# Patient Record
Sex: MALE | Race: OTHER | NOT HISPANIC OR LATINO | URBAN - METROPOLITAN AREA
[De-identification: names, ages, dates, MRNs, and addresses within clinical notes are randomized per-mention and may not be internally consistent; named-entity substitution may affect disease eponyms.]

---

## 2019-08-24 ENCOUNTER — EMERGENCY (EMERGENCY)
Facility: HOSPITAL | Age: 24
LOS: 1 days | Discharge: HOME | End: 2019-08-24
Attending: EMERGENCY MEDICINE | Admitting: EMERGENCY MEDICINE
Payer: COMMERCIAL

## 2019-08-24 VITALS
OXYGEN SATURATION: 100 % | RESPIRATION RATE: 16 BRPM | DIASTOLIC BLOOD PRESSURE: 65 MMHG | SYSTOLIC BLOOD PRESSURE: 110 MMHG | HEART RATE: 98 BPM

## 2019-08-24 VITALS
DIASTOLIC BLOOD PRESSURE: 71 MMHG | TEMPERATURE: 98 F | OXYGEN SATURATION: 99 % | HEART RATE: 95 BPM | RESPIRATION RATE: 16 BRPM | SYSTOLIC BLOOD PRESSURE: 119 MMHG

## 2019-08-24 LAB
ALBUMIN SERPL ELPH-MCNC: 4.7 G/DL — SIGNIFICANT CHANGE UP (ref 3.3–5)
ALP SERPL-CCNC: 61 U/L — SIGNIFICANT CHANGE UP (ref 40–120)
ALT FLD-CCNC: 66 U/L — HIGH (ref 4–41)
ANION GAP SERPL CALC-SCNC: 15 MMO/L — HIGH (ref 7–14)
APPEARANCE UR: CLEAR — SIGNIFICANT CHANGE UP
AST SERPL-CCNC: 39 U/L — SIGNIFICANT CHANGE UP (ref 4–40)
BACTERIA # UR AUTO: NEGATIVE — SIGNIFICANT CHANGE UP
BASOPHILS # BLD AUTO: 0.04 K/UL — SIGNIFICANT CHANGE UP (ref 0–0.2)
BASOPHILS NFR BLD AUTO: 0.3 % — SIGNIFICANT CHANGE UP (ref 0–2)
BILIRUB SERPL-MCNC: 0.4 MG/DL — SIGNIFICANT CHANGE UP (ref 0.2–1.2)
BILIRUB UR-MCNC: NEGATIVE — SIGNIFICANT CHANGE UP
BLOOD UR QL VISUAL: NEGATIVE — SIGNIFICANT CHANGE UP
BUN SERPL-MCNC: 15 MG/DL — SIGNIFICANT CHANGE UP (ref 7–23)
CALCIUM SERPL-MCNC: 9.5 MG/DL — SIGNIFICANT CHANGE UP (ref 8.4–10.5)
CHLORIDE SERPL-SCNC: 103 MMOL/L — SIGNIFICANT CHANGE UP (ref 98–107)
CO2 SERPL-SCNC: 20 MMOL/L — LOW (ref 22–31)
COLOR SPEC: SIGNIFICANT CHANGE UP
CREAT SERPL-MCNC: 1.21 MG/DL — SIGNIFICANT CHANGE UP (ref 0.5–1.3)
EOSINOPHIL # BLD AUTO: 0.03 K/UL — SIGNIFICANT CHANGE UP (ref 0–0.5)
EOSINOPHIL NFR BLD AUTO: 0.2 % — SIGNIFICANT CHANGE UP (ref 0–6)
GLUCOSE SERPL-MCNC: 114 MG/DL — HIGH (ref 70–99)
GLUCOSE UR-MCNC: NEGATIVE — SIGNIFICANT CHANGE UP
HCT VFR BLD CALC: 43.3 % — SIGNIFICANT CHANGE UP (ref 39–50)
HGB BLD-MCNC: 14.9 G/DL — SIGNIFICANT CHANGE UP (ref 13–17)
HYALINE CASTS # UR AUTO: NEGATIVE — SIGNIFICANT CHANGE UP
IMM GRANULOCYTES NFR BLD AUTO: 0.4 % — SIGNIFICANT CHANGE UP (ref 0–1.5)
KETONES UR-MCNC: SIGNIFICANT CHANGE UP
LEUKOCYTE ESTERASE UR-ACNC: NEGATIVE — SIGNIFICANT CHANGE UP
LYMPHOCYTES # BLD AUTO: 1.8 K/UL — SIGNIFICANT CHANGE UP (ref 1–3.3)
LYMPHOCYTES # BLD AUTO: 11.3 % — LOW (ref 13–44)
MAGNESIUM SERPL-MCNC: 2.2 MG/DL — SIGNIFICANT CHANGE UP (ref 1.6–2.6)
MCHC RBC-ENTMCNC: 30.3 PG — SIGNIFICANT CHANGE UP (ref 27–34)
MCHC RBC-ENTMCNC: 34.4 % — SIGNIFICANT CHANGE UP (ref 32–36)
MCV RBC AUTO: 88 FL — SIGNIFICANT CHANGE UP (ref 80–100)
MONOCYTES # BLD AUTO: 0.63 K/UL — SIGNIFICANT CHANGE UP (ref 0–0.9)
MONOCYTES NFR BLD AUTO: 4 % — SIGNIFICANT CHANGE UP (ref 2–14)
NEUTROPHILS # BLD AUTO: 13.34 K/UL — HIGH (ref 1.8–7.4)
NEUTROPHILS NFR BLD AUTO: 83.8 % — HIGH (ref 43–77)
NITRITE UR-MCNC: NEGATIVE — SIGNIFICANT CHANGE UP
NRBC # FLD: 0 K/UL — SIGNIFICANT CHANGE UP (ref 0–0)
PH UR: 6 — SIGNIFICANT CHANGE UP (ref 5–8)
PHOSPHATE SERPL-MCNC: 2.1 MG/DL — LOW (ref 2.5–4.5)
PLATELET # BLD AUTO: 251 K/UL — SIGNIFICANT CHANGE UP (ref 150–400)
PMV BLD: 9.8 FL — SIGNIFICANT CHANGE UP (ref 7–13)
POTASSIUM SERPL-MCNC: 4.1 MMOL/L — SIGNIFICANT CHANGE UP (ref 3.5–5.3)
POTASSIUM SERPL-SCNC: 4.1 MMOL/L — SIGNIFICANT CHANGE UP (ref 3.5–5.3)
PROT SERPL-MCNC: 7.4 G/DL — SIGNIFICANT CHANGE UP (ref 6–8.3)
PROT UR-MCNC: 30 — SIGNIFICANT CHANGE UP
RBC # BLD: 4.92 M/UL — SIGNIFICANT CHANGE UP (ref 4.2–5.8)
RBC # FLD: 12.3 % — SIGNIFICANT CHANGE UP (ref 10.3–14.5)
RBC CASTS # UR COMP ASSIST: SIGNIFICANT CHANGE UP (ref 0–?)
SODIUM SERPL-SCNC: 138 MMOL/L — SIGNIFICANT CHANGE UP (ref 135–145)
SP GR SPEC: 1.02 — SIGNIFICANT CHANGE UP (ref 1–1.04)
SQUAMOUS # UR AUTO: SIGNIFICANT CHANGE UP
UROBILINOGEN FLD QL: NORMAL — SIGNIFICANT CHANGE UP
WBC # BLD: 15.9 K/UL — HIGH (ref 3.8–10.5)
WBC # FLD AUTO: 15.9 K/UL — HIGH (ref 3.8–10.5)
WBC UR QL: SIGNIFICANT CHANGE UP (ref 0–?)

## 2019-08-24 PROCEDURE — 99285 EMERGENCY DEPT VISIT HI MDM: CPT

## 2019-08-24 PROCEDURE — 71046 X-RAY EXAM CHEST 2 VIEWS: CPT | Mod: 26

## 2019-08-24 PROCEDURE — 93010 ELECTROCARDIOGRAM REPORT: CPT | Mod: 59

## 2019-08-24 PROCEDURE — 99285 EMERGENCY DEPT VISIT HI MDM: CPT | Mod: 25

## 2019-08-24 PROCEDURE — 70450 CT HEAD/BRAIN W/O DYE: CPT | Mod: 26

## 2019-08-24 RX ORDER — LEVETIRACETAM 250 MG/1
500 TABLET, FILM COATED ORAL ONCE
Refills: 0 | Status: DISCONTINUED | OUTPATIENT
Start: 2019-08-24 | End: 2019-08-31

## 2019-08-24 RX ORDER — ACETAMINOPHEN 500 MG
650 TABLET ORAL ONCE
Refills: 0 | Status: COMPLETED | OUTPATIENT
Start: 2019-08-24 | End: 2019-08-24

## 2019-08-24 RX ORDER — LEVETIRACETAM 250 MG/1
500 TABLET, FILM COATED ORAL ONCE
Refills: 0 | Status: COMPLETED | OUTPATIENT
Start: 2019-08-24 | End: 2019-08-24

## 2019-08-24 RX ORDER — DIVALPROEX SODIUM 500 MG/1
500 TABLET, DELAYED RELEASE ORAL ONCE
Refills: 0 | Status: DISCONTINUED | OUTPATIENT
Start: 2019-08-24 | End: 2019-08-24

## 2019-08-24 RX ORDER — LEVETIRACETAM 250 MG/1
1 TABLET, FILM COATED ORAL
Qty: 28 | Refills: 0
Start: 2019-08-24 | End: 2019-09-06

## 2019-08-24 RX ORDER — DIVALPROEX SODIUM 500 MG/1
1 TABLET, DELAYED RELEASE ORAL
Qty: 28 | Refills: 0
Start: 2019-08-24 | End: 2019-09-06

## 2019-08-24 RX ADMIN — Medication 650 MILLIGRAM(S): at 16:36

## 2019-08-24 RX ADMIN — LEVETIRACETAM 500 MILLIGRAM(S): 250 TABLET, FILM COATED ORAL at 16:37

## 2019-08-24 NOTE — ED ADULT TRIAGE NOTE - CHIEF COMPLAINT QUOTE
Pt brought in by EMS from home s/p witnessed seizure. Pt has had 1 seizure in the past in April. Was not placed on medications. IV access 18g L had placed by EMS.

## 2019-08-24 NOTE — CONSULT NOTE ADULT - ASSESSMENT
Assessment: 24 year old M with PMH of seizures (not on AED's) presents after a witnessed seizure on 8/24. Physical significant for tongue lacerations.    Impression: left head tilt, left gaze deviation, and left arm shaking that progressed to generalized shaking likely secondary to right brain dysfunction causing partial complex seizure with secondary generalization.    Plan:  Imaging:  [] CTH without contrast- new onset headache r/o intracranial pathology    Medications:  [] keppra 1000 mg PO, then 500 BID PO afterward    Other:  [] Follow up in outpatient epilepsy clinic [phone]. Patient should bring test results from prior workup.  [] Patient cannot drive for six months. Patient should not operate heavy machinery.      Case discussed with neurology attending, Dr. Ramirez Assessment: 24 year old M with PMH of seizures (not on AED's) presents after a witnessed seizure on 8/24. Physical significant for tongue lacerations.    Impression: left head tilt, left gaze deviation, and left arm shaking that progressed to generalized shaking likely secondary to right brain dysfunction causing partial complex seizure with secondary generalization.    Plan:  Imaging:  [] CTH without contrast- new onset headache r/o intracranial pathology    Medications:  [] keppra 1000 mg PO, then 500 BID PO afterward    Other:  [] If CT normal, can follow up in outpatient epilepsy clinic [phone]. Patient should bring test results from prior workup.  [] Patient cannot drive for six months. Patient should not operate heavy machinery.  Counselled on additional safety precautions (don't stand a train platform edges, no swimming alone)    Case discussed with neurology attending, Dr. Ramirez

## 2019-08-24 NOTE — CONSULT NOTE ADULT - ATTENDING COMMENTS
Patient seen and examined. This is his second or third seizure in 4 months, with prior imaging and EEG apparently negative. His exam was normal. If head CT here shows no obvious abnormality, in my judgment he can be followed up as outpatient by epilepsy specialist, though he should start treatment (as it is not his first event). We counselled him on safety precautions, and that he cannot drive for at least 6 months.

## 2019-08-24 NOTE — ED PROVIDER NOTE - PROGRESS NOTE DETAILS
Alex PGY3: CTH wnl labs unremarkable s/p depakote 500 mg will give another 500 mg now and d/c with depakote 5000 mg bid per neuro recs and epilepsy f//u on reassessment pt in nad states no seziure activity since coming to ed

## 2019-08-24 NOTE — ED PROVIDER NOTE - PHYSICAL EXAMINATION
NEURO: pupils 3 mm, PERRL, EOMI (CN III, IV, VI), facial sensation intact to light touch in all 3 divisions bilat (CN V), face is symmetric with normal eye closure, eye opening, and smile (CN VII), hearing is normal to rubbing fingers (CN VII), palate elevates symmetrically, phonation is normal (CN IX, X),  shoulder shrug intact bilat (CN XI), tongue is midline with nl movements and no atrophy (CN XII), finger to nose test nl bilat, negative pronator drift bilat, speech is clear; 5/5 motor strength BUE and BLE: deltoids, biceps, triceps, wrist flexors/extensors, hand , hip flexors, knee flexors/extensors, plantar/dorsiflexors, hallux flexors/extensors; sensation intact to light touch BUE and BLE: C5-T1 and L3-S1; gait wnl   + R beating horizontal nystagmus b/l

## 2019-08-24 NOTE — CONSULT NOTE ADULT - SUBJECTIVE AND OBJECTIVE BOX
HPI: 24 year old M with PMH of seizures (not on AED's) presents after a witnessed seizure on . Patient was sleeping at 12 pm on . Patient's wife witnessed the patient's left arm stiffen initially and he gave unclear responses to her questions. After a few seconds his eyes rolled up and to the left and his head tilted to the left. She noticed bitemporal bulging, a tongue bite, and bilateral arm shaking which later included bilateral leg shaking. Patient was unaware of the event and had a period of confusion that lasted until 12:45 pm. He did not have urinary incontinence. He states that he has been having less sleep than usual this week. Patient had an unwitnessed seizure in April where he had tongue lacerations and had a workup by an outside neurologist who did a 24 hour EEG, which was negative and an MRI which was negative. Patient's wife noticed that 2 months ago while they were walking the patient's arm stiffened in a similar manner, but he did not have any alteration in consciousness. Patient is an  and drives. No family history of seizures. Patient currently has a mild headache which has persisted since the seizure. He denies weakness, numbness, tingling, dysarthria, or dysphagia.       REVIEW OF SYSTEMS	    A 10-system ROS was performed and is negative except for those items noted above and/or in the HPI.    PAST MEDICAL & SURGICAL HISTORY:  Seizure  No significant past surgical history    FAMILY HISTORY:  No pertinent family history in first degree relatives    SOCIAL HISTORY:   T/E/D: denies tobacco use, drinks alcohol <1x week  Occupation:   Lives with: wife    MEDICATIONS (HOME):  Home Medications:    MEDICATIONS  (STANDING):    MEDICATIONS  (PRN):    ALLERGIES/INTOLERANCES:  Allergies  No Known Allergies    Intolerances    VITALS & EXAMINATION:  Vital Signs Last 24 Hrs  T(C): 36.5 (24 Aug 2019 13:19), Max: 36.5 (24 Aug 2019 13:19)  T(F): 97.7 (24 Aug 2019 13:19), Max: 97.7 (24 Aug 2019 13:19)  HR: 98 (24 Aug 2019 14:42) (95 - 98)  BP: 110/65 (24 Aug 2019 14:42) (110/65 - 119/71)  BP(mean): --  RR: 16 (24 Aug 2019 14:42) (16 - 16)  SpO2: 100% (24 Aug 2019 14:42) (99% - 100%)    General: Male, appears stated age, in no apparent distress including pain    Neurological (>12):  MS: Awake, alert, oriented to person, place, situation, time. Normal affect. Follows all commands.    Language: Speech is clear, fluent with good repetition & comprehension    CNs: PERRL (R = 3mm, L = 3mm). VFF. EOMI no nystagmus, no diplopia. V1-3 intact to LT, No facial asymmetry b/l, full eye closure strength b/l. Hearing grossly normal (rubbing fingers) b/l. Symmetric palate elevation in midline. Gag reflex deferred. Head turning & shoulder shrug intact b/l. Tongue midline, normal movements, no atrophy, tongue lacerations on left and right lateral borders    Motor: Normal muscle bulk & tone. No noticeable tremor. No pronator drift.              Deltoid	Biceps	Triceps	Wrist	Finger ABd	   R	5	5	5	5	5		5 	  L	5	5	5	5	5		5    	H-Flex	H-Ext	H-ABd	H-ADd	K-Flex	K-Ext	D-Flex	P-Flex  R	5	5	5	5	5	5	5	5 	   L	5	5	5	5	5	5	5	5	     Sensation: Intact to LT b/l throughout.     Cortical: Extinction on DSS (neglect): none    Reflexes:              Biceps(C5)       BR(C6)     Triceps(C7)               Patellar(L4)    Achilles(S1)    Plantar Resp  R	2	          2	             2		        2		    2		Down   L	2	          2	             2		        2		    2		Down     Coordination: No dysmetria to FTN/HTS    Gait: Normal Romberg. No postural instability. Normal stance and tandem gait.     LABORATORY:  CBC                       14.9   15.90 )-----------( 251      ( 24 Aug 2019 14:30 )             43.3     Chem 08-24    138  |  103  |  15  ----------------------------<  114<H>  4.1   |  20<L>  |  1.21    Ca    9.5      24 Aug 2019 14:30  Phos  2.1     08-24  Mg     2.2     08-24    TPro  7.4  /  Alb  4.7  /  TBili  0.4  /  DBili  x   /  AST  39  /  ALT  66<H>  /  AlkPhos  61  08-24    LFTs LIVER FUNCTIONS - ( 24 Aug 2019 14:30 )  Alb: 4.7 g/dL / Pro: 7.4 g/dL / ALK PHOS: 61 u/L / ALT: 66 u/L / AST: 39 u/L / GGT: x           Coagulopathy   Lipid Panel   A1c   Cardiac enzymes     U/A Urinalysis Basic - ( 24 Aug 2019 15:52 )    Color: LIGHT YELLOW / Appearance: CLEAR / S.020 / pH: 6.0  Gluc: NEGATIVE / Ketone: TRACE  / Bili: NEGATIVE / Urobili: NORMAL   Blood: NEGATIVE / Protein: 30 / Nitrite: NEGATIVE   Leuk Esterase: NEGATIVE / RBC: 0-2 / WBC 0-2   Sq Epi: OCC / Non Sq Epi: x / Bacteria: NEGATIVE      CSF  Immunological  Other    STUDIES & IMAGING:  Studies (EKG, EEG, EMG, etc):     Radiology (XR, CT, MR, U/S, TTE/BILLIE):

## 2019-08-24 NOTE — ED ADULT NURSE NOTE - OBJECTIVE STATEMENT
patient alert ox3 came in c/o had a seizure today. patient had a seizure once before and all the tests came normal. patient is not on any meds. labs done as ordered. awaiting results.

## 2019-08-24 NOTE — ED PROVIDER NOTE - NSFOLLOWUPINSTRUCTIONS_ED_ALL_ED_FT
1. You were seen for . A copy of any of your resulted labs, imaging, and findings have been provided to you.   2. DO NOT DRIVE UNTIL YOU SEE A NEUROLOGIST.  depakote from your pharmacy and take the medication as prescribed on the bottle's manufacterer's label, and consult a pharmacist or your primary care doctor with any questions. Continue to take your home medications as prescribed.   3. Follow up with [facility] (CALL [phone] TO MAKE AN APPOINTMENT) AND your primary care doctor within 48 hours to assess the symptoms you were seen for in the emergency department and to review all results from your visit. If you don't have a doctor, call [phone] to make an appointment.  4. Return immediately to the emergency department for new, persistent, or worsening symptoms or signs. Return immediately to the emergency department if you have chest pain, shortness of breath, loss of consciousness, or seizure.   5. For your for health, you should make healthy food choices and be physically active. Also, you should not smoke or use drugs, and you should not drink alcohol in excess. Please visit [email or web address] for resources and more information.

## 2019-08-24 NOTE — ED PROVIDER NOTE - ATTENDING CONTRIBUTION TO CARE
Pt was seen and evaluated by me. Pt is a 25 y/o male with PMHx of seizure who presented to the ED for episode of seizure activity today. Pt states having his 1st seizure in April and was worked up and not started on any medications at that time. Pt noted while lying on the cough today his wife noticed he had some shaking with eyes rolled back. Pt was noted to have episode last around 5 minutes and then confusion after for around 30 mins. Pt currently at baseline. Pt denies any fever, chills, nausea, vomiting, SOB, chest pain, or abd pain. Denies vision changes or weakness. Pt denies any inciting factors. Lungs CTA b/l. RRR. Abd soft, non-tender. No focal deficits. 5/5 b/l UE and LE.

## 2019-08-24 NOTE — ED PROVIDER NOTE - CLINICAL SUMMARY MEDICAL DECISION MAKING FREE TEXT BOX
23 yo M c PMH of seizure presents 2 hours after seizure that happened while pt was lying on the couch, per wife who witnessed event pts LUE started to shake then had GTC that resolved spontaneously followed by post-ical state. had negative seizure w/u in past, not on meds. On exam, VS wnl, non-focal neuro exam. likely gtc requiring start of meds. called pts neurologist Dr. Sanford, he is not available and has no covering physician. neuro called and will see pt to assess appropriate course for new seizure meds. labs/cxr/ua pending to assess for seizure provocation. pt had multiple normal cth's in past few months, will not repeat. will reassess.

## 2023-07-19 NOTE — ED PROVIDER NOTE - OBJECTIVE STATEMENT
23 yo M c PMH of seizure presents 2 hours after seizure that happened while pt was lying on the couch, per wife who witnessed event pts LUE started to shake then pts eyes started rolling back in his head leaning to R side and his head started shaking, then his whole body started shaking. seizure lasted for 5 minutes and resolved spontaneously. pt was drowsy for 30 minutes, only responding to baseline, now at baseline in ED. pt does not remember event, reports mild occipital HA that started after the seizure. possible mild hitting of head on the wall but no signficant head trauma per wife. +tongue bite.    pt had first seizure x 1 in April, pt went to the ER where CTH was wnl. pt saw neurologist, CTH was repeated and EEG was done and both were wnl. pt was never started on any meds.     neuro: Sonali Sanford  PMD: Omer Robert
1.7